# Patient Record
Sex: FEMALE | Employment: STUDENT
[De-identification: names, ages, dates, MRNs, and addresses within clinical notes are randomized per-mention and may not be internally consistent; named-entity substitution may affect disease eponyms.]

---

## 2017-07-29 ENCOUNTER — HEALTH MAINTENANCE LETTER (OUTPATIENT)
Age: 22
End: 2017-07-29

## 2020-02-16 ENCOUNTER — HEALTH MAINTENANCE LETTER (OUTPATIENT)
Age: 25
End: 2020-02-16

## 2020-11-22 ENCOUNTER — HEALTH MAINTENANCE LETTER (OUTPATIENT)
Age: 25
End: 2020-11-22

## 2021-03-16 ENCOUNTER — OFFICE VISIT (OUTPATIENT)
Dept: FAMILY MEDICINE | Facility: CLINIC | Age: 26
End: 2021-03-16
Payer: COMMERCIAL

## 2021-03-16 VITALS
HEART RATE: 66 BPM | OXYGEN SATURATION: 98 % | RESPIRATION RATE: 16 BRPM | WEIGHT: 166 LBS | BODY MASS INDEX: 26.68 KG/M2 | DIASTOLIC BLOOD PRESSURE: 80 MMHG | TEMPERATURE: 98.5 F | SYSTOLIC BLOOD PRESSURE: 120 MMHG | HEIGHT: 66 IN

## 2021-03-16 DIAGNOSIS — Z11.8 SPECIAL SCREENING EXAMINATION FOR CHLAMYDIAL DISEASE: ICD-10-CM

## 2021-03-16 DIAGNOSIS — Z23 NEED FOR IMMUNIZATION AGAINST INFLUENZA: Primary | ICD-10-CM

## 2021-03-16 DIAGNOSIS — Z23 NEED FOR PROPHYLACTIC VACCINATION AND INOCULATION AGAINST INFLUENZA: ICD-10-CM

## 2021-03-16 PROCEDURE — 87491 CHLMYD TRACH DNA AMP PROBE: CPT | Performed by: PHYSICIAN ASSISTANT

## 2021-03-16 PROCEDURE — 99202 OFFICE O/P NEW SF 15 MIN: CPT | Mod: 25 | Performed by: PHYSICIAN ASSISTANT

## 2021-03-16 PROCEDURE — 90471 IMMUNIZATION ADMIN: CPT | Performed by: PHYSICIAN ASSISTANT

## 2021-03-16 PROCEDURE — 90686 IIV4 VACC NO PRSV 0.5 ML IM: CPT | Performed by: PHYSICIAN ASSISTANT

## 2021-03-16 RX ORDER — ALBUTEROL SULFATE 90 UG/1
2 AEROSOL, METERED RESPIRATORY (INHALATION)
COMMUNITY
Start: 2020-12-10

## 2021-03-16 RX ORDER — ESCITALOPRAM OXALATE 20 MG/1
20 TABLET ORAL
COMMUNITY
Start: 2020-12-10

## 2021-03-16 RX ORDER — DEXTROAMPHETAMINE SACCHARATE, AMPHETAMINE ASPARTATE, DEXTROAMPHETAMINE SULFATE AND AMPHETAMINE SULFATE 7.5; 7.5; 7.5; 7.5 MG/1; MG/1; MG/1; MG/1
30 TABLET ORAL
COMMUNITY
Start: 2021-02-08

## 2021-03-16 RX ORDER — BUPROPION HYDROCHLORIDE 75 MG/1
75 TABLET ORAL
COMMUNITY
Start: 2020-12-10 | End: 2021-04-22

## 2021-03-16 RX ORDER — DEXTROAMPHETAMINE SACCHARATE, AMPHETAMINE ASPARTATE MONOHYDRATE, DEXTROAMPHETAMINE SULFATE AND AMPHETAMINE SULFATE 7.5; 7.5; 7.5; 7.5 MG/1; MG/1; MG/1; MG/1
30 CAPSULE, EXTENDED RELEASE ORAL
COMMUNITY
Start: 2021-02-08

## 2021-03-16 ASSESSMENT — ENCOUNTER SYMPTOMS
MUSCULOSKELETAL NEGATIVE: 1
CONSTITUTIONAL NEGATIVE: 1
PSYCHIATRIC NEGATIVE: 1
RESPIRATORY NEGATIVE: 1
NEUROLOGICAL NEGATIVE: 1
GASTROINTESTINAL NEGATIVE: 1
CARDIOVASCULAR NEGATIVE: 1
EYES NEGATIVE: 1

## 2021-03-16 ASSESSMENT — MIFFLIN-ST. JEOR: SCORE: 1519.72

## 2021-03-16 NOTE — PATIENT INSTRUCTIONS
We are working hard to begin vaccinating more people against COVID-19. Currently, we are vaccinating:    Individuals age 65 and older.    Phase 1a healthcare workers, both paid and unpaid, who are unable to do their job remotely.     People age 16 years and older with certain conditions or disabilities described in Phase 1b tier 2.      If you fit into one of these categories, please log in to [facility] using this link to see if we have an open appointment and schedule an appointment.  Most new appointments are released on Tuesdays at 8 a.m. This is when appointment availability is best. Additional appointments may open up during the week as appointments are canceled or we receive additional vaccine.    If there are no appointments left, you will be unable to schedule.   If you have technical difficulty using [facility], call [phone] for assistance.      You can learn more about the [facility]'s phased approach to administering the vaccine, with details on each phase,?[email or web address].     As vaccine supply increases and we are able to open appointments to more groups, we will share that information on our website [email or web address]. Check this website to stay up to date on COVID-19 vaccination information.

## 2021-03-16 NOTE — PROGRESS NOTES
"    Assessment & Plan   Problem List Items Addressed This Visit     None      Visit Diagnoses     Need for immunization against influenza    -  Primary    Special screening examination for chlamydial disease        Relevant Orders    Chlamydia trachomatis PCR    Need for prophylactic vaccination and inoculation against influenza        Relevant Orders    INFLUENZA VACCINE IM > 6 MONTHS VALENT IIV4 [32757] (Completed)         We will schedule Nexplanon for tomorrow - patient will need to complete a urine HCG tomorrow                  Return in about 1 day (around 3/17/2021) for Nexplanon removal and insertion.    [facility] 25 [facility] who presents for the following health issues     HPI     Concern - Consult  Onset:   Description: pt here for consult to have nexplanon removed and reinserted  Intensity: moderate  Progression of Symptoms:    Accompanying Signs & Symptoms:   Previous history of similar problem:   Precipitating factors:        Worsened by:   Alleviating factors:        Improved by:   Therapies tried and outcome:  none         Review of Systems   Constitutional: Negative.    HENT: Negative.    Eyes: Negative.    Respiratory: Negative.    Cardiovascular: Negative.    Gastrointestinal: Negative.    Genitourinary: Negative.    Musculoskeletal: Negative.    Skin: Negative.    Neurological: Negative.    Psychiatric/Behavioral: Negative.             Objective    /80   Pulse 66   Temp 98.5  F (36.9  C)   Resp 16   Ht 1.676 m (5' 6\")   Wt 75.3 kg (166 lb)   SpO2 98%   BMI 26.79 kg/m    Body mass index is 26.79 kg/m .  Physical Exam  Constitutional:       General: She is not in acute distress.     Appearance: She is well-developed. She is not diaphoretic.   HENT:      Head: Normocephalic.      Right Ear: External ear normal.      Left Ear: External ear normal.      Nose: Nose normal.   Eyes:      Conjunctiva/sclera: " Conjunctivae normal.   Neck:      Musculoskeletal: Normal range of motion.   Pulmonary:      Effort: Pulmonary effort is normal.   Neurological:      Mental Status: She is alert and oriented to person, place, and time.   Psychiatric:         Judgment: Judgment normal.

## 2021-03-17 ENCOUNTER — OFFICE VISIT (OUTPATIENT)
Dept: FAMILY MEDICINE | Facility: CLINIC | Age: 26
End: 2021-03-17
Payer: COMMERCIAL

## 2021-03-17 VITALS
TEMPERATURE: 97.4 F | WEIGHT: 163 LBS | HEART RATE: 80 BPM | DIASTOLIC BLOOD PRESSURE: 86 MMHG | SYSTOLIC BLOOD PRESSURE: 120 MMHG | HEIGHT: 66 IN | BODY MASS INDEX: 26.2 KG/M2

## 2021-03-17 DIAGNOSIS — Z30.017 INSERTION OF IMPLANTABLE SUBDERMAL CONTRACEPTIVE: Primary | ICD-10-CM

## 2021-03-17 DIAGNOSIS — Z30.46 ENCOUNTER FOR REMOVAL OF SUBDERMAL CONTRACEPTIVE IMPLANT: ICD-10-CM

## 2021-03-17 LAB
C TRACH DNA SPEC QL NAA+PROBE: NEGATIVE
HCG UR QL: NEGATIVE
SPECIMEN SOURCE: NORMAL

## 2021-03-17 PROCEDURE — 11983 REMOVE/INSERT DRUG IMPLANT: CPT | Performed by: PHYSICIAN ASSISTANT

## 2021-03-17 PROCEDURE — 81025 URINE PREGNANCY TEST: CPT | Performed by: PHYSICIAN ASSISTANT

## 2021-03-17 PROCEDURE — 99207 PR NO CHARGE LOS: CPT | Performed by: PHYSICIAN ASSISTANT

## 2021-03-17 ASSESSMENT — MIFFLIN-ST. JEOR: SCORE: 1506.11

## 2021-03-17 NOTE — PROGRESS NOTES
Assessment & Plan   Problem List Items Addressed This Visit     None      Visit Diagnoses     Insertion of implantable subdermal contraceptive    -  Primary    Relevant Orders    HCG Qual, Urine ([id number]) (Completed)    REMOVE & REINSERT NON-BIODEGRADABLE DRUG DELIVERY IMPLANT (Completed)    Encounter for removal of subdermal contraceptive implant        Relevant Medications    etonogestrel (NEXPLANON) subdermal implant 68 mg (Completed)    Other Relevant Orders    REMOVE & REINSERT NON-BIODEGRADABLE DRUG DELIVERY IMPLANT (Completed)                            Return in about 4 weeks (around 4/14/2021) for Preventive Care Visit with Pap.    Lisa Blum PA-C  [facility] MOIRA Ceballos is a 25 year old who presents for the following health issues     HPI         Nexplanon - patient requesting removal and reinsertion.   Procedure has been discussed and questions answered.   Results for orders placed or performed in visit on 03/17/21 (from the past 24 hour(s))   HCG Qual, Urine ([id number])   Result Value Ref Range    HCG Qual Urine Negative NEG^Negative         The Nexplanon jorge luis was located in the left side  arm.    The patient was placed in the supine position with her left side  arm flexed at the elbow, externally rotated, and placed with her wrist parallel to her ear. The implanon jorge luis was identified  at the inner aspect of the arm. The insertion site was marked with a sterile marker. The area was cleaned with betadine swabs and anesthetized with 3 ml of 1% lidocaine with epinephrine along the planned insertion tunnel.  A 2 mm incision was made with the scalpel in the prevous scar and the implanon jorge luis grasped with a hemastat.  The tissue surrounding the jorge luis was dissected away and the tip of the implanon was pulled out, removing the entire jorge luis with ease.      The Nexplanon applicator was removed from its blister. The needle shield was removed, maintaining the applicator  "upright. The white implant was visualized in the needle tip. Counter-traction was applied to the skin at the needle insertion site.  The tip of the needle was inserted at the site, beveled side up, at a 30-degree angle. The applicator was then lowered to a horizontal position. While lifting the skin with the tip of the needle, the needle was inserted to its full length. The slider was unlocked and moved fully back to the stop.     The 4 cm jorge luis was palpated under the skin. Steri-strips were placed over the incision and a pressure bandage was applied. The patient was instructed to remove the bangage in 24 hours and replace with a band-aid.  The patient tolerated the procedures well and there were no complications.     The user card was filled out and given to the patient to keep.  The Patient Chart Label was completed and sent for scanning.     Review of Systems         Objective    /86   Pulse 80   Temp 97.4  F (36.3  C) (Tympanic)   Ht 1.676 m (5' 6\")   Wt 73.9 kg (163 lb)   LMP 03/10/2021   BMI 26.31 kg/m    Body mass index is 26.31 kg/m .  Physical Exam  Constitutional:       General: She is not in acute distress.     Appearance: She is well-developed. She is not diaphoretic.   HENT:      Head: Normocephalic.      Right Ear: External ear normal.      Left Ear: External ear normal.      Nose: Nose normal.   Eyes:      Conjunctiva/sclera: Conjunctivae normal.   Neck:      Musculoskeletal: Normal range of motion.   Pulmonary:      Effort: Pulmonary effort is normal.   Neurological:      Mental Status: She is alert and oriented to person, place, and time.   Psychiatric:         Judgment: Judgment normal.                \plain        "

## 2021-03-18 NOTE — RESULT ENCOUNTER NOTE
Coryn    Your lab tests are complete and I have reviewed the results.     - Your lab results look great; everything is normal.    If you have any questions or concerns, please feel free to call or send a [employer] message.    Sincerely,  Aram Blum PA-C

## 2021-04-04 ENCOUNTER — HEALTH MAINTENANCE LETTER (OUTPATIENT)
Age: 26
End: 2021-04-04

## 2021-04-16 ENCOUNTER — OFFICE VISIT (OUTPATIENT)
Dept: FAMILY MEDICINE | Facility: CLINIC | Age: 26
End: 2021-04-16
Payer: COMMERCIAL

## 2021-04-16 VITALS
HEIGHT: 66 IN | DIASTOLIC BLOOD PRESSURE: 70 MMHG | OXYGEN SATURATION: 92 % | HEART RATE: 96 BPM | BODY MASS INDEX: 26.23 KG/M2 | SYSTOLIC BLOOD PRESSURE: 120 MMHG | TEMPERATURE: 98.2 F | WEIGHT: 163.2 LBS | RESPIRATION RATE: 18 BRPM

## 2021-04-16 DIAGNOSIS — S40.852A: Primary | ICD-10-CM

## 2021-04-16 DIAGNOSIS — L08.9: Primary | ICD-10-CM

## 2021-04-16 PROCEDURE — 99213 OFFICE O/P EST LOW 20 MIN: CPT | Mod: 25 | Performed by: PHYSICIAN ASSISTANT

## 2021-04-16 PROCEDURE — 11982 REMOVE DRUG IMPLANT DEVICE: CPT | Performed by: PHYSICIAN ASSISTANT

## 2021-04-16 RX ORDER — CEPHALEXIN 500 MG/1
500 CAPSULE ORAL 4 TIMES DAILY
Qty: 28 CAPSULE | Refills: 0 | Status: SHIPPED | OUTPATIENT
Start: 2021-04-16 | End: 2021-04-23

## 2021-04-16 ASSESSMENT — ENCOUNTER SYMPTOMS
MUSCULOSKELETAL NEGATIVE: 1
GASTROINTESTINAL NEGATIVE: 1
CHILLS: 0
CARDIOVASCULAR NEGATIVE: 1
PSYCHIATRIC NEGATIVE: 1
ROS SKIN COMMENTS: AS IN HPI
EYES NEGATIVE: 1
NEUROLOGICAL NEGATIVE: 1
FEVER: 0
CONSTITUTIONAL NEGATIVE: 1
RESPIRATORY NEGATIVE: 1

## 2021-04-16 ASSESSMENT — MIFFLIN-ST. JEOR: SCORE: 1502.02

## 2021-04-16 NOTE — PROGRESS NOTES
"    Assessment & Plan   Problem List Items Addressed This Visit     None      Visit Diagnoses     Superficial foreign body of left upper arm with infection, initial encounter    -  Primary    Relevant Medications    cephALEXin (KEFLEX) 500 MG capsule    Other Relevant Orders    REMOVAL NON-BIODEGRADABLE DRUG DELIVERY IMPLANT         Insertion site infected.   Remove Nexplanon today - near expulsion at open wound today  Keflex to treat infection  Recheck wound in one week, ok to complete Nexplanon insertion into right arm at that time                   Return in about 1 week (around 4/23/2021) for Preventive Care Visit with Pap, Nexplanon Insertion Right arm .    [facility] 26 [facility] who presents for the following health issues     HPI     Concern - wound check  Onset: 03/17/2021  Description: incision from nexplanon replacement is not healing. Wound is uncomfortable and draining.   Intensity: moderate  Progression of Symptoms:  worsening  Accompanying Signs & Symptoms: None  Previous history of similar problem: None  Precipitating factors:        Worsened by: None  Alleviating factors:        Improved by: None  Therapies tried and outcome: None        Review of Systems   Constitutional: Negative.  Negative for chills and fever.   HENT: Negative.    Eyes: Negative.    Respiratory: Negative.    Cardiovascular: Negative.    Gastrointestinal: Negative.    Genitourinary: Negative.    Musculoskeletal: Negative.    Skin:        As in HPI   Neurological: Negative.    Psychiatric/Behavioral: Negative.             Objective    /70   Pulse 96   Temp 98.2  F (36.8  C) (Tympanic)   Resp 18   Ht 1.676 m (5' 6\")   Wt 74 kg (163 lb 3.2 oz)   SpO2 92%   BMI 26.34 kg/m    Body mass index is 26.34 kg/m .  Physical Exam  Constitutional:       General: She is not in acute distress.     Appearance: She is well-developed. She is not " diaphoretic.   HENT:      Head: Normocephalic.      Right Ear: External ear normal.      Left Ear: External ear normal.      Nose: Nose normal.   Eyes:      Conjunctiva/sclera: Conjunctivae normal.   Neck:      Musculoskeletal: Normal range of motion.   Pulmonary:      Effort: Pulmonary effort is normal.   Skin:     Comments: Left arm open wound over Nexplanon implant at distal end (insertion site).  Purulent drainage, no surrounding erythema.  Tender.    Neurological:      Mental Status: She is alert and oriented to person, place, and time.   Psychiatric:         Judgment: Judgment normal.        The Nexplanon jorge luis was located in the left side  arm.  The distal and proximal ends of the jorge luis were located and the area cleansed with betadine.  Distal end is exposed at infected open wound.  Approximately 3 cc's of 1% lidocaine with epinephrine was injected into the area under the Nexplanon jorge luis and a small skin incision made using a #11 blade.  The Nexplanon was gently manipulated until the tip was at the incision. The jorge luis tip was easily removed and purulent drainage was also removed.  The incision site was hemostatic and a small dressing was applied.  Due to the underlying infection, the wound was not closed, but was left open to drain.  Pt tolerated procedure without complication.    Antibiotic prescription was sent for infection - cephalexin.

## 2021-04-22 ENCOUNTER — OFFICE VISIT (OUTPATIENT)
Dept: FAMILY MEDICINE | Facility: CLINIC | Age: 26
End: 2021-04-22
Payer: COMMERCIAL

## 2021-04-22 VITALS
BODY MASS INDEX: 26.63 KG/M2 | SYSTOLIC BLOOD PRESSURE: 94 MMHG | DIASTOLIC BLOOD PRESSURE: 60 MMHG | HEART RATE: 74 BPM | WEIGHT: 165 LBS | TEMPERATURE: 96.8 F | OXYGEN SATURATION: 100 %

## 2021-04-22 DIAGNOSIS — Z30.017 INSERTION OF IMPLANTABLE SUBDERMAL CONTRACEPTIVE: ICD-10-CM

## 2021-04-22 DIAGNOSIS — F33.41 RECURRENT MAJOR DEPRESSIVE DISORDER, IN PARTIAL REMISSION (H): ICD-10-CM

## 2021-04-22 DIAGNOSIS — Z12.4 SCREENING FOR MALIGNANT NEOPLASM OF CERVIX: ICD-10-CM

## 2021-04-22 DIAGNOSIS — Z00.00 ENCOUNTER FOR ROUTINE ADULT HEALTH EXAMINATION WITHOUT ABNORMAL FINDINGS: Primary | ICD-10-CM

## 2021-04-22 DIAGNOSIS — Z11.4 SCREENING FOR HIV (HUMAN IMMUNODEFICIENCY VIRUS): ICD-10-CM

## 2021-04-22 DIAGNOSIS — J45.990 EXERCISE-INDUCED ASTHMA: ICD-10-CM

## 2021-04-22 DIAGNOSIS — F41.1 GAD (GENERALIZED ANXIETY DISORDER): ICD-10-CM

## 2021-04-22 DIAGNOSIS — Z11.59 NEED FOR HEPATITIS C SCREENING TEST: ICD-10-CM

## 2021-04-22 LAB
CHOLEST SERPL-MCNC: 146 MG/DL
GLUCOSE SERPL-MCNC: 89 MG/DL (ref 70–99)
HCG UR QL: NEGATIVE
HCV AB SERPL QL IA: NONREACTIVE
HDLC SERPL-MCNC: 63 MG/DL
HIV 1+2 AB+HIV1 P24 AG SERPL QL IA: NONREACTIVE
LDLC SERPL CALC-MCNC: 73 MG/DL
NONHDLC SERPL-MCNC: 83 MG/DL
TRIGL SERPL-MCNC: 48 MG/DL

## 2021-04-22 PROCEDURE — 81025 URINE PREGNANCY TEST: CPT | Performed by: PHYSICIAN ASSISTANT

## 2021-04-22 PROCEDURE — 87389 HIV-1 AG W/HIV-1&-2 AB AG IA: CPT | Performed by: PHYSICIAN ASSISTANT

## 2021-04-22 PROCEDURE — 82947 ASSAY GLUCOSE BLOOD QUANT: CPT | Performed by: PHYSICIAN ASSISTANT

## 2021-04-22 PROCEDURE — G0145 SCR C/V CYTO,THINLAYER,RESCR: HCPCS | Performed by: PHYSICIAN ASSISTANT

## 2021-04-22 PROCEDURE — 99395 PREV VISIT EST AGE 18-39: CPT | Mod: 25 | Performed by: PHYSICIAN ASSISTANT

## 2021-04-22 PROCEDURE — 87624 HPV HI-RISK TYP POOLED RSLT: CPT | Performed by: PHYSICIAN ASSISTANT

## 2021-04-22 PROCEDURE — 36415 COLL VENOUS BLD VENIPUNCTURE: CPT | Performed by: PHYSICIAN ASSISTANT

## 2021-04-22 PROCEDURE — 80061 LIPID PANEL: CPT | Performed by: PHYSICIAN ASSISTANT

## 2021-04-22 PROCEDURE — 86803 HEPATITIS C AB TEST: CPT | Performed by: PHYSICIAN ASSISTANT

## 2021-04-22 RX ORDER — BUPROPION HYDROCHLORIDE 75 MG/1
75 TABLET ORAL 2 TIMES DAILY
COMMUNITY
Start: 2021-04-22

## 2021-04-22 ASSESSMENT — ENCOUNTER SYMPTOMS
MUSCULOSKELETAL NEGATIVE: 1
EYES NEGATIVE: 1
RESPIRATORY NEGATIVE: 1
NEUROLOGICAL NEGATIVE: 1
GASTROINTESTINAL NEGATIVE: 1
CARDIOVASCULAR NEGATIVE: 1
CONSTITUTIONAL NEGATIVE: 1
PSYCHIATRIC NEGATIVE: 1

## 2021-04-22 NOTE — PROGRESS NOTES
"    Assessment & Plan   Problem List Items Addressed This Visit     None      Visit Diagnoses     Encounter for routine adult health examination without abnormal findings    -  Primary    Relevant Orders    REVIEW OF HEALTH MAINTENANCE PROTOCOL ORDERS (Completed)    Lipid panel reflex to direct LDL Fasting (Completed)    Glucose (Completed)    Screening for HIV (human immunodeficiency virus)        Relevant Orders    HIV Antigen Antibody Combo (Completed)    Need for hepatitis C screening test        Relevant Orders    Hepatitis C Screen Reflex to HCV RNA Quant and Genotype (Completed)    Screening for malignant neoplasm of cervix        Relevant Orders    Pap imaged thin layer screen with HPV - recommended age 30 - 65 years (select HPV order below) (Completed)    Insertion of implantable subdermal contraceptive        Relevant Medications    etonogestrel (NEXPLANON) subdermal implant 68 mg (Completed)    Other Relevant Orders    HCG Qual, Urine ([id number]) (Completed)    ORVILLE (generalized anxiety disorder)        Relevant Medications    buPROPion (WELLBUTRIN) 75 MG tablet    Recurrent major depressive disorder, in partial remission (H)        Relevant Medications    buPROPion (WELLBUTRIN) 75 MG tablet    Exercise-induced asthma             1. Preventive care as above  2. Reviewed Hx MDD/ORVILLE - this is followed by a different provider.  Medications are up to date in our chart - patient feels anxiety and depression are fairly well controlled.   3. Nexplanon insertion.   4. Exercise-induced asthma.  ACT completed today.     Patient has been advised of split billing requirements and indicates understanding: Yes  COUNSELING:  Reviewed preventive health counseling, as reflected in patient instructions    Estimated body mass index is 26.63 kg/m  as calculated from the following:    Height as of 4/16/21: 1.676 m (5' 6\").    Weight as of this encounter: 74.8 kg (165 lb).    She reports that she has never smoked. She has never " "used smokeless tobacco.               BMI:   Estimated body mass index is 26.63 kg/m  as calculated from the following:    Height as of 4/16/21: 1.676 m (5' 6\").    Weight as of this encounter: 74.8 kg (165 lb).   Weight management plan: Discussed healthy diet and exercise guidelines        Return in about 1 year (around 4/22/2022) for Preventive Care Visit.    Lisa Blum PA-C  [facility] MOIRA Ceballos is a 26 year old who presents for the following health issues     HPI     Preventive Care    - Patient has no specific preventive concerns today  - Family history reviewed, positive for heart disease.  Negative for cancer.   - Mental health provider at a different facility.  Medications reviewed today.         Past Medical History:   Diagnosis Date     Acne      Allergic rhinitis      Asthma      Unspecified asthma(493.90)        Past Surgical History:   Procedure Laterality Date     INNER EAR SURGERY      patient had tubes placed and removed as a child       Family History   Problem Relation Age of Onset     C.A.D. Father      Respiratory Father      Diabetes Other         GP     Hypertension Other         GP     Cerebrovascular Disease Other         GP     Hypertension Maternal Grandmother      Alzheimer Disease Maternal Grandmother      Depression Maternal Grandmother      Hypertension Maternal Grandfather        Social History     Tobacco Use     Smoking status: Never Smoker     Smokeless tobacco: Never Used   Substance Use Topics     Alcohol use: Yes     Current Outpatient Medications   Medication     albuterol (PROAIR HFA/PROVENTIL HFA/VENTOLIN HFA) 108 (90 Base) MCG/ACT inhaler     amphetamine-dextroamphetamine (ADDERALL XR) 30 MG 24 hr capsule     amphetamine-dextroamphetamine (ADDERALL) 30 MG tablet     buPROPion (WELLBUTRIN) 75 MG tablet     cephALEXin (KEFLEX) 500 MG capsule     escitalopram (LEXAPRO) 20 MG tablet     fexofenadine (ALLEGRA) 30 MG tablet     " Ibuprofen (IBU-200 PO)     No current facility-administered medications for this visit.          Review of Systems   Constitutional: Negative.    HENT: Negative.    Eyes: Negative.    Respiratory: Negative.    Cardiovascular: Negative.    Gastrointestinal: Negative.    Genitourinary: Negative.    Musculoskeletal: Negative.    Skin: Negative.    Neurological: Negative.    Psychiatric/Behavioral: Negative.             Objective    BP 94/60   Pulse 74   Temp 96.8  F (36  C) (Tympanic)   Wt 74.8 kg (165 lb)   LMP 04/19/2021   SpO2 100%   BMI 26.63 kg/m    Body mass index is 26.63 kg/m .  Physical Exam  Constitutional:       General: She is not in acute distress.     Appearance: She is well-developed.   HENT:      Right Ear: Tympanic membrane and external ear normal.      Left Ear: Tympanic membrane and external ear normal.      Nose: Nose normal.      Mouth/Throat:      Pharynx: No oropharyngeal exudate.   Eyes:      General:         Right eye: No discharge.         Left eye: No discharge.      Conjunctiva/sclera: Conjunctivae normal.      Pupils: Pupils are equal, round, and reactive to light.   Neck:      Musculoskeletal: Neck supple.      Thyroid: No thyromegaly.      Trachea: No tracheal deviation.   Cardiovascular:      Rate and Rhythm: Normal rate and regular rhythm.      Pulses: Normal pulses.      Heart sounds: Normal heart sounds, S1 normal and S2 normal. No murmur. No friction rub. No S3 or S4 sounds.    Pulmonary:      Effort: Pulmonary effort is normal. No respiratory distress.      Breath sounds: Normal breath sounds. No wheezing or rales.   Chest:      Breasts:         Right: No mass, nipple discharge or tenderness.         Left: No mass, nipple discharge or tenderness.   Abdominal:      Palpations: Abdomen is soft. There is no mass.      Tenderness: There is no abdominal tenderness.   Genitourinary:     Vagina: Bleeding (menstruation) present.      Cervix: No cervical motion tenderness or discharge.    Musculoskeletal: Normal range of motion.   Lymphadenopathy:      Cervical: No cervical adenopathy.   Skin:     General: Skin is warm and dry.      Findings: No rash.      Comments: 1. Left arm - previous Nexplanon removal location is healing well.  No evidence for infection. Wound c/d/i  2. Right arm - skin is clear.    Neurological:      Mental Status: She is alert and oriented to person, place, and time.      Motor: No abnormal muscle tone.   Psychiatric:         Thought Content: Thought content normal.         Judgment: Judgment normal.        PROCEDURE NOTE:  The patient was placed in the supine position with her right arm flexed at the elbow, externally rotated, and placed with her wrist parallel to her ear. The insertion site was marked with a sterile marker. The area was cleaned with betadine swabs and anesthetized with 3 ml of 1% lidocaine with epinephrine along the planned insertion tunnel.  The Nexplanon applicator was removed from its blister. The needle shield was removed, maintaining the applicator upright. The white implant was visualized in the needle tip. Counter-traction was applied to the skin at the needle insertion site.  The tip of the needle was inserted at the site, beveled side up, at a 30-degree angle. The applicator was then lowered to a horizontal position. While lifting the skin with the tip of the needle, the needle was inserted to its full length. The slider was unlocked and moved fully back to the stop.   The 4 cm jorge luis was palpated under the skin. The patient also palpated the jorge luis.  A  pressure bandage was applied with sterile gauze. The patient was instructed to remove the bandage in 24 hours and replace with a band-aid.  The patient tolerated the procedures well and there were no complications.     The user card was filled out and given to the patient to keep.  The Patient Chart Label was completed and sent for scanning.          Results for orders placed or performed in visit on  04/22/21 (from the past 24 hour(s))   HCG Qual, Urine ([id number])   Result Value Ref Range    HCG Qual Urine Negative NEG^Negative

## 2021-04-22 NOTE — PROGRESS NOTES
SUBJECTIVE:   CC: Lin Quinonez is an 26 year old woman who presents for preventive health visit.     {Split Bill scripting  The purpose of this visit is to discuss your medical history and prevent health problems before you are sick. You may be responsible for a co-pay, coinsurance, or deductible if your visit today includes services such as checking on a sore throat, having an x-ray or lab test, or treating and evaluating a new or existing condition :944316}  Patient has been advised of split billing requirements and indicates understanding: {Yes and No:763951}  HPI  {Add if <65 person on Medicare  - Required Questions (Optional):368632}  {Outside tests to abstract? :666980}    {additional problems to add (Optional):746576}    Today's PHQ-2 Score:   PHQ-2 ( 1999 Pfizer) 3/16/2021   Q1: Little interest or pleasure in doing things 0   Q2: Feeling down, depressed or hopeless 0   PHQ-2 Score 0       Abuse: Current or Past (Physical, Sexual or Emotional) - { :845744}  Do you feel safe in your environment? { :643088}    Have you ever done Advance Care Planning? (For example, a Health Directive, POLST, or a discussion with a medical provider or your loved ones about your wishes): { :673624}    Social History     Tobacco Use     Smoking status: Never Smoker     Smokeless tobacco: Never Used   Substance Use Topics     Alcohol use: Yes     {Rooming Staff- Complete this question if Prescreen response is not shown below for today's visit. If you drink alcohol do you typically have >3 drinks per day or >7 drinks per week? (Optional):109106}    No flowsheet data found.{add AUDIT responses (Optional) (A score of 7 for adult men is an indication of hazardous drinking; a score of 8 or more is an indication of an alcohol use disorder.  A score of 7 or more for adult women is an indication of hazardous drinking or an alchohol use disorder):896466}    Reviewed orders with patient.  Reviewed health maintenance and updated  "orders accordingly - { :231502::\"Yes\"}  {Chronicprobdata (optional):831564}    Breast Cancer Screening:  Any new diagnosis of family breast, ovarian, or bowel cancer? {Yes_Link to Screening / No:724232}    FSH-7: No flowsheet data found.  {If any of the questions to the BCRA (FHS-7) are answered yes, consider ordering referral for genetic counseling (Optional) :196050::\"click delete button to remove this line now\"}  {AMB Mammogram Decision Support (Optional) :608101}  Pertinent mammograms are reviewed under the imaging tab.    History of abnormal Pap smear: { :030454}     Reviewed and updated as needed this visit by clinical staff  Tobacco  Allergies  Meds      Soc Hx        Reviewed and updated as needed this visit by Provider                {HISTORY OPTIONS (Optional):236927}    Review of Systems  {FEMALE ROS (Optional):752674}     OBJECTIVE:   BP 94/60   Pulse 74   Temp 96.8  F (36  C) (Tympanic)   Wt 74.8 kg (165 lb)   LMP 04/19/2021   SpO2 100%   BMI 26.63 kg/m    Physical Exam  {Exam Choices (Optional):323457}    {Diagnostic Test Results (Optional):041700::\"Diagnostic Test Results:\",\"Labs reviewed in Epic\"}    ASSESSMENT/PLAN:   {Diag Picklist:975705}    Patient has been advised of split billing requirements and indicates understanding: {YES / NO:634090::\"Yes\"}  COUNSELING:  {FEMALE COUNSELING MESSAGES:032871::\"Reviewed preventive health counseling, as reflected in patient instructions\"}    Estimated body mass index is 26.63 kg/m  as calculated from the following:    Height as of 4/16/21: 1.676 m (5' 6\").    Weight as of this encounter: 74.8 kg (165 lb).    {Weight Management Plan (ACO) Complete if BMI is abnormal-  Ages 18-64  BMI >24.9.  Age 65+ with BMI <23 or >30 (Optional):594482}    She reports that she has never smoked. She has never used smokeless tobacco.      Counseling Resources:  ATP IV Guidelines  Pooled Cohorts Equation Calculator  Breast Cancer Risk Calculator  BRCA-Related Cancer Risk " Assessment: FHS-7 Tool  FRAX Risk Assessment  ICSI Preventive Guidelines  Dietary Guidelines for Americans, 2010  USDA's MyPlate  ASA Prophylaxis  Lung CA Screening    MARCELA Wick [facility]

## 2021-04-23 ASSESSMENT — ASTHMA QUESTIONNAIRES: ACT_TOTALSCORE: 23

## 2021-04-26 LAB
COPATH REPORT: NORMAL
PAP: NORMAL

## 2021-04-27 LAB
FINAL DIAGNOSIS: NORMAL
HPV HR 12 DNA CVX QL NAA+PROBE: NEGATIVE
HPV16 DNA SPEC QL NAA+PROBE: NEGATIVE
HPV18 DNA SPEC QL NAA+PROBE: NEGATIVE
SPECIMEN DESCRIPTION: NORMAL
SPECIMEN SOURCE CVX/VAG CYTO: NORMAL

## 2021-04-29 PROBLEM — R87.615 UNSATISFACTORY CERVICAL PAPANICOLAOU SMEAR: Status: ACTIVE | Noted: 2019-05-06

## 2021-04-29 NOTE — RESULT ENCOUNTER NOTE
Lin    Your lab tests are complete and I have reviewed the results.     - Your lab results look great; everything is normal.  - You will receive your Pap Smear results in a separate letter.    If you have any questions or concerns, please feel free to call or send a [employer] message.    Sincerely,  Aram Blum PA-C

## 2021-09-18 ENCOUNTER — HEALTH MAINTENANCE LETTER (OUTPATIENT)
Age: 26
End: 2021-09-18

## 2022-06-25 ENCOUNTER — HEALTH MAINTENANCE LETTER (OUTPATIENT)
Age: 27
End: 2022-06-25

## 2022-11-20 ENCOUNTER — HEALTH MAINTENANCE LETTER (OUTPATIENT)
Age: 27
End: 2022-11-20

## 2023-07-08 ENCOUNTER — HEALTH MAINTENANCE LETTER (OUTPATIENT)
Age: 28
End: 2023-07-08